# Patient Record
Sex: FEMALE | Race: OTHER | NOT HISPANIC OR LATINO
[De-identification: names, ages, dates, MRNs, and addresses within clinical notes are randomized per-mention and may not be internally consistent; named-entity substitution may affect disease eponyms.]

---

## 2022-12-01 ENCOUNTER — TRANSCRIPTION ENCOUNTER (OUTPATIENT)
Age: 37
End: 2022-12-01

## 2022-12-01 ENCOUNTER — INPATIENT (INPATIENT)
Facility: HOSPITAL | Age: 37
LOS: 0 days | Discharge: HOME | End: 2022-12-02
Attending: OBSTETRICS & GYNECOLOGY | Admitting: OBSTETRICS & GYNECOLOGY

## 2022-12-01 VITALS
TEMPERATURE: 98 F | RESPIRATION RATE: 16 BRPM | SYSTOLIC BLOOD PRESSURE: 136 MMHG | HEART RATE: 77 BPM | OXYGEN SATURATION: 100 % | DIASTOLIC BLOOD PRESSURE: 79 MMHG

## 2022-12-01 LAB
APPEARANCE UR: CLEAR — SIGNIFICANT CHANGE UP
BASOPHILS # BLD AUTO: 0 K/UL — SIGNIFICANT CHANGE UP (ref 0–0.2)
BASOPHILS NFR BLD AUTO: 0 % — SIGNIFICANT CHANGE UP (ref 0–2)
BILIRUB UR-MCNC: NEGATIVE — SIGNIFICANT CHANGE UP
BLD GP AB SCN SERPL QL: NEGATIVE — SIGNIFICANT CHANGE UP
COLOR SPEC: COLORLESS — SIGNIFICANT CHANGE UP
DIFF PNL FLD: ABNORMAL
EOSINOPHIL # BLD AUTO: 0 K/UL — SIGNIFICANT CHANGE UP (ref 0–0.5)
EOSINOPHIL NFR BLD AUTO: 0 % — SIGNIFICANT CHANGE UP (ref 0–6)
GLUCOSE UR QL: NEGATIVE — SIGNIFICANT CHANGE UP
HCG SERPL-ACNC: 2629 MIU/ML — SIGNIFICANT CHANGE UP
HCT VFR BLD CALC: 33.7 % — LOW (ref 34.5–45)
HGB BLD-MCNC: 10.5 G/DL — LOW (ref 11.5–15.5)
IANC: 9.87 K/UL — HIGH (ref 1.8–7.4)
KETONES UR-MCNC: NEGATIVE — SIGNIFICANT CHANGE UP
LEUKOCYTE ESTERASE UR-ACNC: NEGATIVE — SIGNIFICANT CHANGE UP
LYMPHOCYTES # BLD AUTO: 17.4 % — SIGNIFICANT CHANGE UP (ref 13–44)
LYMPHOCYTES # BLD AUTO: 2.39 K/UL — SIGNIFICANT CHANGE UP (ref 1–3.3)
MCHC RBC-ENTMCNC: 21.6 PG — LOW (ref 27–34)
MCHC RBC-ENTMCNC: 31.2 GM/DL — LOW (ref 32–36)
MCV RBC AUTO: 69.3 FL — LOW (ref 80–100)
MONOCYTES # BLD AUTO: 0.48 K/UL — SIGNIFICANT CHANGE UP (ref 0–0.9)
MONOCYTES NFR BLD AUTO: 3.5 % — SIGNIFICANT CHANGE UP (ref 2–14)
NEUTROPHILS # BLD AUTO: 10.65 K/UL — HIGH (ref 1.8–7.4)
NEUTROPHILS NFR BLD AUTO: 77.4 % — HIGH (ref 43–77)
NITRITE UR-MCNC: NEGATIVE — SIGNIFICANT CHANGE UP
PH UR: 6.5 — SIGNIFICANT CHANGE UP (ref 5–8)
PLATELET # BLD AUTO: 369 K/UL — SIGNIFICANT CHANGE UP (ref 150–400)
PROT UR-MCNC: ABNORMAL
RBC # BLD: 4.86 M/UL — SIGNIFICANT CHANGE UP (ref 3.8–5.2)
RBC # FLD: 16.8 % — HIGH (ref 10.3–14.5)
RH IG SCN BLD-IMP: POSITIVE — SIGNIFICANT CHANGE UP
SP GR SPEC: 1.01 — SIGNIFICANT CHANGE UP (ref 1.01–1.05)
UROBILINOGEN FLD QL: SIGNIFICANT CHANGE UP
WBC # BLD: 13.76 K/UL — HIGH (ref 3.8–10.5)
WBC # FLD AUTO: 13.76 K/UL — HIGH (ref 3.8–10.5)

## 2022-12-01 PROCEDURE — 99285 EMERGENCY DEPT VISIT HI MDM: CPT

## 2022-12-01 PROCEDURE — 76830 TRANSVAGINAL US NON-OB: CPT | Mod: 26

## 2022-12-01 RX ORDER — ACETAMINOPHEN 500 MG
650 TABLET ORAL ONCE
Refills: 0 | Status: COMPLETED | OUTPATIENT
Start: 2022-12-01 | End: 2022-12-01

## 2022-12-01 RX ADMIN — Medication 650 MILLIGRAM(S): at 17:44

## 2022-12-01 NOTE — ED ADULT TRIAGE NOTE - CHIEF COMPLAINT QUOTE
Pt is  (all C-sections) 6 weeks pregnant c/o vaginal bleeding w/ clots soaked 6-7 pads since this morning and associated lower abdominal/pelvic pain

## 2022-12-01 NOTE — ED PROVIDER NOTE - CLINICAL SUMMARY MEDICAL DECISION MAKING FREE TEXT BOX
Will order transvaginal sonogram to confirm IUP versus AV in progress versus ectopic pregnancy, will check CBC to assess H&H since the patient is bleeding, hCG level to compare with, UA and culture for symptoms of burning with urination to rule out UTI and consider consultation with OB depending on results of sono and labs.

## 2022-12-01 NOTE — ED PROVIDER NOTE - OBJECTIVE STATEMENT
37-year-old  whose last menstrual period was on 10/1/2022 here for vaginal bleeding that began last night associated with abdominal cramping and burning with urination. Patient denies fevers chills nausea vomiting weakness dizziness.

## 2022-12-01 NOTE — ED PROVIDER NOTE - PROGRESS NOTE DETAILS
Dr. Rhoades: US read as cervical ectopic pregnancy. Pt hemodynamically stable. Will discuss with OBGYN. Dr. Rhoades: OB/GYN resident at bedside, while discussing results of ultrasound with patient her  provides a note from her primary OB/GYN detailing cervical scar ectopic pregnancy.  OB/GYN will contact for further recommendations given admitting privileges for possible admission.

## 2022-12-01 NOTE — ED ADULT NURSE NOTE - OBJECTIVE STATEMENT
38 y/o female P3, AAOx4, ambulatory at baseline. Patient c/o of vaginal bleeding since yesterday at 12 pm. States she has saturated 8 pads today. Denies h/a, dizziness, cp. Patient noted to be breathing even and unlabored. No pallor or diaphoresis noted at this time. States she is 6 weeks pregnant. #20g placed to LAC. Labs drawn and sent as per MD order. Patient awaiting US.

## 2022-12-02 ENCOUNTER — TRANSCRIPTION ENCOUNTER (OUTPATIENT)
Age: 37
End: 2022-12-02

## 2022-12-02 ENCOUNTER — RESULT REVIEW (OUTPATIENT)
Age: 37
End: 2022-12-02

## 2022-12-02 ENCOUNTER — APPOINTMENT (OUTPATIENT)
Dept: ANTEPARTUM | Facility: HOSPITAL | Age: 37
End: 2022-12-02

## 2022-12-02 ENCOUNTER — ASOB RESULT (OUTPATIENT)
Age: 37
End: 2022-12-02

## 2022-12-02 VITALS
HEART RATE: 70 BPM | OXYGEN SATURATION: 98 % | DIASTOLIC BLOOD PRESSURE: 60 MMHG | RESPIRATION RATE: 17 BRPM | TEMPERATURE: 98 F | SYSTOLIC BLOOD PRESSURE: 102 MMHG

## 2022-12-02 DIAGNOSIS — O00.90 UNSPECIFIED ECTOPIC PREGNANCY WITHOUT INTRAUTERINE PREGNANCY: ICD-10-CM

## 2022-12-02 PROBLEM — Z00.00 ENCOUNTER FOR PREVENTIVE HEALTH EXAMINATION: Status: ACTIVE | Noted: 2022-12-02

## 2022-12-02 LAB
ALBUMIN SERPL ELPH-MCNC: 4 G/DL — SIGNIFICANT CHANGE UP (ref 3.3–5)
ALP SERPL-CCNC: 81 U/L — SIGNIFICANT CHANGE UP (ref 40–120)
ALT FLD-CCNC: 8 U/L — SIGNIFICANT CHANGE UP (ref 4–33)
ANION GAP SERPL CALC-SCNC: 10 MMOL/L — SIGNIFICANT CHANGE UP (ref 7–14)
AST SERPL-CCNC: 13 U/L — SIGNIFICANT CHANGE UP (ref 4–32)
BASOPHILS # BLD AUTO: 0.01 K/UL — SIGNIFICANT CHANGE UP (ref 0–0.2)
BASOPHILS NFR BLD AUTO: 0.1 % — SIGNIFICANT CHANGE UP (ref 0–2)
BILIRUB SERPL-MCNC: 0.6 MG/DL — SIGNIFICANT CHANGE UP (ref 0.2–1.2)
BUN SERPL-MCNC: 10 MG/DL — SIGNIFICANT CHANGE UP (ref 7–23)
CALCIUM SERPL-MCNC: 8.8 MG/DL — SIGNIFICANT CHANGE UP (ref 8.4–10.5)
CHLORIDE SERPL-SCNC: 104 MMOL/L — SIGNIFICANT CHANGE UP (ref 98–107)
CO2 SERPL-SCNC: 25 MMOL/L — SIGNIFICANT CHANGE UP (ref 22–31)
CREAT SERPL-MCNC: 0.57 MG/DL — SIGNIFICANT CHANGE UP (ref 0.5–1.3)
CULTURE RESULTS: SIGNIFICANT CHANGE UP
EGFR: 121 ML/MIN/1.73M2 — SIGNIFICANT CHANGE UP
EOSINOPHIL # BLD AUTO: 0.11 K/UL — SIGNIFICANT CHANGE UP (ref 0–0.5)
EOSINOPHIL NFR BLD AUTO: 1.3 % — SIGNIFICANT CHANGE UP (ref 0–6)
FLUAV AG NPH QL: SIGNIFICANT CHANGE UP
FLUBV AG NPH QL: SIGNIFICANT CHANGE UP
GLUCOSE SERPL-MCNC: 82 MG/DL — SIGNIFICANT CHANGE UP (ref 70–99)
HCT VFR BLD CALC: 31 % — LOW (ref 34.5–45)
HGB BLD-MCNC: 9.6 G/DL — LOW (ref 11.5–15.5)
IANC: 5.39 K/UL — SIGNIFICANT CHANGE UP (ref 1.8–7.4)
IMM GRANULOCYTES NFR BLD AUTO: 0.4 % — SIGNIFICANT CHANGE UP (ref 0–0.9)
LYMPHOCYTES # BLD AUTO: 2.12 K/UL — SIGNIFICANT CHANGE UP (ref 1–3.3)
LYMPHOCYTES # BLD AUTO: 25.4 % — SIGNIFICANT CHANGE UP (ref 13–44)
MCHC RBC-ENTMCNC: 21.8 PG — LOW (ref 27–34)
MCHC RBC-ENTMCNC: 31 GM/DL — LOW (ref 32–36)
MCV RBC AUTO: 70.3 FL — LOW (ref 80–100)
MONOCYTES # BLD AUTO: 0.7 K/UL — SIGNIFICANT CHANGE UP (ref 0–0.9)
MONOCYTES NFR BLD AUTO: 8.4 % — SIGNIFICANT CHANGE UP (ref 2–14)
NEUTROPHILS # BLD AUTO: 5.39 K/UL — SIGNIFICANT CHANGE UP (ref 1.8–7.4)
NEUTROPHILS NFR BLD AUTO: 64.4 % — SIGNIFICANT CHANGE UP (ref 43–77)
NRBC # BLD: 0 /100 WBCS — SIGNIFICANT CHANGE UP (ref 0–0)
NRBC # FLD: 0 K/UL — SIGNIFICANT CHANGE UP (ref 0–0)
PLATELET # BLD AUTO: 313 K/UL — SIGNIFICANT CHANGE UP (ref 150–400)
POTASSIUM SERPL-MCNC: 3.6 MMOL/L — SIGNIFICANT CHANGE UP (ref 3.5–5.3)
POTASSIUM SERPL-SCNC: 3.6 MMOL/L — SIGNIFICANT CHANGE UP (ref 3.5–5.3)
PROT SERPL-MCNC: 6.4 G/DL — SIGNIFICANT CHANGE UP (ref 6–8.3)
RBC # BLD: 4.41 M/UL — SIGNIFICANT CHANGE UP (ref 3.8–5.2)
RBC # FLD: 17.1 % — HIGH (ref 10.3–14.5)
RSV RNA NPH QL NAA+NON-PROBE: SIGNIFICANT CHANGE UP
SARS-COV-2 RNA SPEC QL NAA+PROBE: SIGNIFICANT CHANGE UP
SODIUM SERPL-SCNC: 139 MMOL/L — SIGNIFICANT CHANGE UP (ref 135–145)
SPECIMEN SOURCE: SIGNIFICANT CHANGE UP
WBC # BLD: 8.36 K/UL — SIGNIFICANT CHANGE UP (ref 3.8–10.5)
WBC # FLD AUTO: 8.36 K/UL — SIGNIFICANT CHANGE UP (ref 3.8–10.5)

## 2022-12-02 PROCEDURE — 88305 TISSUE EXAM BY PATHOLOGIST: CPT | Mod: 26

## 2022-12-02 PROCEDURE — 99221 1ST HOSP IP/OBS SF/LOW 40: CPT | Mod: 25

## 2022-12-02 PROCEDURE — 76815 OB US LIMITED FETUS(S): CPT | Mod: 26

## 2022-12-02 PROCEDURE — 76817 TRANSVAGINAL US OBSTETRIC: CPT | Mod: 26

## 2022-12-02 RX ORDER — SODIUM CHLORIDE 9 MG/ML
1000 INJECTION, SOLUTION INTRAVENOUS
Refills: 0 | Status: DISCONTINUED | OUTPATIENT
Start: 2022-12-02 | End: 2022-12-02

## 2022-12-02 RX ORDER — LEVOTHYROXINE SODIUM 125 MCG
25 TABLET ORAL DAILY
Refills: 0 | Status: DISCONTINUED | OUTPATIENT
Start: 2022-12-02 | End: 2022-12-02

## 2022-12-02 RX ORDER — ACETAMINOPHEN 500 MG
1000 TABLET ORAL ONCE
Refills: 0 | Status: DISCONTINUED | OUTPATIENT
Start: 2022-12-02 | End: 2022-12-02

## 2022-12-02 RX ORDER — ACETAMINOPHEN 500 MG
650 TABLET ORAL EVERY 6 HOURS
Refills: 0 | Status: DISCONTINUED | OUTPATIENT
Start: 2022-12-02 | End: 2022-12-02

## 2022-12-02 RX ADMIN — SODIUM CHLORIDE 125 MILLILITER(S): 9 INJECTION, SOLUTION INTRAVENOUS at 21:37

## 2022-12-02 RX ADMIN — SODIUM CHLORIDE 125 MILLILITER(S): 9 INJECTION, SOLUTION INTRAVENOUS at 20:09

## 2022-12-02 RX ADMIN — Medication 650 MILLIGRAM(S): at 14:12

## 2022-12-02 RX ADMIN — Medication 650 MILLIGRAM(S): at 15:10

## 2022-12-02 NOTE — DISCHARGE NOTE PROVIDER - CARE PROVIDER_API CALL
Kerrie Khan)  Obstetrics and Gynecology  [address], [address]  [place] [address]  Phone: [phone]  Fax: [phone]  Follow Up Time:    Arlene Larry  OBSTETRICS AND GYNECOLOGY  [address], [address]  [place] [address]  Phone: [phone]  Fax: [phone]  Follow Up Time: 2 weeks

## 2022-12-02 NOTE — PROGRESS NOTE ADULT - SUBJECTIVE AND OBJECTIVE BOX
37y G_P_ presents with   HPI:  Gyn Consult Note  RADHA RHODES  37y  Female [id number]    HPI: 38yo  LMP 10/1 presenting with 1-day history of heavy vaginal bleeding. She has also had intermittent lower abdominal pain, but this is not her primary complaint. Patient is primarily Portuguese-speaking, refused  and  translated.     Of note, upon evaluation from GYN, patient presented a paper Rx explaining that she was seen by Dr. Larry earlier in the day () for heavy vaginal bleeding. She had an US which raised concern for  scar ectopic, 6 weeks and 1 day gestation. She had bHCG of 6257 on  (approx 48hrs ago).     At this time, patient reports using 10 pads during the day today, of which she nearly filled most. She is nauseous but attributes this to hunger. Denies chest pain, shortness of breath, fevers/chills, severe abdominal pain.       Name of GYN Physician: Dr. Larry    OBHx:    3x c/s at full term, , , , denies complications/transfusions  2x MAB  1x TOP s/p D&C   GYNHx: Denies fibroids, cysts, endometriosis, STI's, Abnormal pap smears   PMH: H/o HTN, (was briefly on medications but hasnt taken for years), GERD, Hypothyroidism  PSH: CSx3, D&C  Meds: Famotidine, Synthroid  All: NKDA    accepts blood     (02 Dec 2022 01:40)      OB/GYN HISTORY:   [place]:  Parity:  Term:  :  MAB/TAB/Ectopic:  Living:    Last Menstrual Period    Name of GYN Physician:  Date of Last Pap:  History of Abnormal Pap:  Date of Last Mammogram:  Date of Last Colonoscopy:  Current OCP use:     PAST MEDICAL & SURGICAL HISTORY:  No significant past surgical history  ceasean section          REVIEW OF SYSTEMS      General:	    Skin/Breast:  	  Ophthalmologic:  	  ENMT:	    Respiratory and Thorax:  	  Cardiovascular:	    Gastrointestinal:	    Genitourinary:	    Musculoskeletal:	    Neurological:	    Psychiatric:	    Hematology/Lymphatics:	    Endocrine:	    Allergic/Immunologic:	    MEDICATIONS  (STANDING):  lactated ringers. 1000 milliLiter(s) (125 mL/Hr) IV Continuous <Continuous>  levothyroxine 25 MICROGram(s) Oral daily    MEDICATIONS  (PRN):  acetaminophen     Tablet .. 650 milliGRAM(s) Oral every 6 hours PRN Moderate Pain (4 - 6)      Allergies    No Known Allergies    Intolerances        SOCIAL HISTORY:  FAMILY HISTORY:          Vital Signs Last 24 Hrs  T(C): 36.7 (02 Dec 2022 08:30), Max: 37.2 (01 Dec 2022 16:59)  T(F): 98.1 (02 Dec 2022 08:30), Max: 99 (01 Dec 2022 16:59)  HR: 70 (02 Dec 2022 08:30) (56 - 77)  BP: 114/72 (02 Dec 2022 08:30) (104/58 - 142/89)  BP(mean): --  RR: 16 (02 Dec 2022 08:30) (15 - 18)  SpO2: 99% (02 Dec 2022 08:30) (99% - 100%)    Parameters below as of 02 Dec 2022 08:30  Patient On (Oxygen Delivery Method): room air      EXAM:  Constitutional:  Abdomen:   GYN Exam:      LABS:                        9.6    8.36  )-----------( 313      ( 02 Dec 2022 06:50 )             31.0     12-02    139  |  104  |  10  ----------------------------<  82  3.6   |  25  |  0.57    Ca    8.8      02 Dec 2022 06:50    TPro  6.4  /  Alb  4.0  /  TBili  0.6  /  DBili  x   /  AST  13  /  ALT  8   /  AlkPhos  81  12-02      Urinalysis Basic - ( 01 Dec 2022 18:03 )    Color: Colorless / Appearance: Clear / S.011 / pH: x  Gluc: x / Ketone: Negative  / Bili: Negative / Urobili: <2 mg/dL   Blood: x / Protein: Trace / Nitrite: Negative   Leuk Esterase: Negative / RBC: 190 /HPF / WBC 1 /HPF   Sq Epi: x / Non Sq Epi: 0 /HPF / Bacteria: Negative        RADIOLOGY & ADDITIONAL STUDIES:      ASSESSMENT:  37yG_P_    Last Menstrual Period    with

## 2022-12-02 NOTE — H&P ADULT - NSHPLABSRESULTS_GEN_ALL_CORE
LABS:                          10.5   13.76 )-----------( 369      ( 01 Dec 2022 18:03 )             33.7       Type + Screen (22 @ 18:03)   ABO Interpretation: O   Rh Interpretation: Positive   Antibody Screen: Negative     HCG Quantitative, Serum: 2629.0      Urinalysis Basic - ( 01 Dec 2022 18:03 )    Color: Colorless / Appearance: Clear / S.011 / pH: x  Gluc: x / Ketone: Negative  / Bili: Negative / Urobili: <2 mg/dL   Blood: x / Protein: Trace / Nitrite: Negative   Leuk Esterase: Negative / RBC: 190 /HPF / WBC 1 /HPF   Sq Epi: x / Non Sq Epi: 0 /HPF / Bacteria: Negative        RADIOLOGY & ADDITIONAL STUDIES:        < from: US Transvaginal (22 @ 22:39) >    US TRANSVAGINAL                          PROCEDURE DATE:  2022          INTERPRETATION:  CLINICAL INFORMATION: Pelvic pain, pregnant . Beta-HCG   2629    LMP: 10/1/2022    Estimated Gestational Age by LMP: 8 weeks 5 days    COMPARISON: None available.    Endovaginal pelvic sonogram as per order. Transabdominal pelvic sonogram   was also performed as part of our standard protocol.    FINDINGS:  Uterus: Single intrauterine gestation is noted within the cervix    Gestational Sac Size (mean): 1.34 cm  Gestations Sac Shape : 6 weeks 1 day  [place] Rump Length: There is abnormal appearing fetal pole.  Estimated Gestational Age: 6 weeks 1 day mean sac diameter.  Yolk Sac: Normal  Fetal Heart Rate: Not detected    Right ovary: Not visualized  Left ovary: Not visualized    Fluid: None.    IMPRESSION:  Cervical ectopic pregnancy.  There is an abnormal appearing fetal pole.    No fetal cardiac activity is detected.      --- End of Report ---          WILIAN MITCHELL MD; Resident Radiologist  This document has been electronically signed.  BELEN MEYER MD; Attending Radiologist  This document has been electronically signed. Dec  1 2022 11:59PM    < end of copied text >

## 2022-12-02 NOTE — PROGRESS NOTE ADULT - ASSESSMENT
seen by mfm, repeat sono. Recommendation by Samia Crook and Garcia to have DVC rather than expectant management  No active blkeeding  Patient is on call to OR  Procedure and possible need for balloon or other intervention  for bleeding explained

## 2022-12-02 NOTE — PRE-OP CHECKLIST - TYPE OF SOLUTION
Maged needs an order placed for psychiatry and marriage counseling  I was going to enter it but unsure as to what to link it to  [facility] 
Order generated for Psychiatry as well as Psychology    There is no specific order for marital counseling
Patient advised
LR@125

## 2022-12-02 NOTE — H&P ADULT - HISTORY OF PRESENT ILLNESS
Gyn Consult Note  RADHA RHODES  37y  Female [id number]    HPI: 36yo  LMP 10/1 presenting with 1-day history of heavy vaginal bleeding. She has also had intermittent lower abdominal pain, but this is not her primary complaint. Patient is primarily Macedonian-speaking, refused  and  translated.     Of note, upon evaluation from GYN, patient presented a paper Rx explaining that she was seen by Dr. Larry earlier in the day () for heavy vaginal bleeding. She had an US which raised concern for  scar ectopic, 6 weeks and 1 day gestation. She had bHCG of 6257 on  (approx 48hrs ago).     At this time, patient reports using 10 pads during the day today, of which she nearly filled most. She is nauseous but attributes this to hunger. Denies chest pain, shortness of breath, fevers/chills, severe abdominal pain.       Name of GYN Physician: Dr. Larry    OBHx:    3x c/s at full term, , , , denies complications/transfusions  2x MAB  1x TOP s/p D&C   GYNHx: Denies fibroids, cysts, endometriosis, STI's, Abnormal pap smears   PMH: H/o HTN, (was briefly on medications but hasnt taken for years), GERD, Hypothyroidism  PSH: CSx3, D&C  Meds: Famotidine, Synthroid  All: NKDA    accepts blood

## 2022-12-02 NOTE — DISCHARGE NOTE NURSING/CASE MANAGEMENT/SOCIAL WORK - NSDCPNINST_GEN_ALL_CORE
Patient discharge home. Patient informed to follow up with provider for any fever or excessive vaginal bleeding, dizziness or lightheadedness and discharge or foul smell from vagina

## 2022-12-02 NOTE — H&P ADULT - NSHPPHYSICALEXAM_GEN_ALL_CORE
Physical Exam:   General: sitting comfortably in bed, NAD   Abd: Soft, non-tender, non-distended.  Bowel sounds present.    :  Scant bleeding on pad.    External labia wnl.  Bimanual exam with 6w sized uterus, tender to palpation.  Cervix closed.   Speculum Exam: No active bleeding from os. Possible tissue visualized at cervical os.  Physiologic discharge.    Ext: non-tender b/l, no edema

## 2022-12-02 NOTE — DISCHARGE NOTE PROVIDER - HOSPITAL COURSE
Patient presented on  with heavy vaginal bleeding.  She had an US which raised concern for  scar ectopic, 6 weeks and 1 day gestation. She had bHCG of 6257 on  (approx 48hrs ago). TVUS performed on  showed Cervical ectopic pregnancy. There is an abnormal appearing fetal pole. No fetal cardiac activity is detected. Patient was evaluated by MFM and recommendation by Dr. Crook and Dr. Zelaya to have DVC rather than expectant management. Patient made NPO.       She underwent uncomplicated ****. Please see operative note for full details. Patient was transferred to recovery room in stable condition.  On POD#1, Thurman catheter was discontinued and patient voided without issues. Patient's diet was advanced and she tolerated regular diet without issues.     Labs drawn post-operatively and on POD#1 were stable compared to pre-op.     POD#2 routine post-operative care was performed.  No notable events.   On POD#3 patient was deemed stable for discharge as she was meeting postoperative milestones - tolerating regular diet, ambulating without difficulty, voiding, and pain was well controlled on oral medications. Throughout admission, patient received prophylactic anticoagulation.    Patient was instructed to follow up with  *** in 2 weeks. Patient presented on  with heavy vaginal bleeding.  She had an US which raised concern for  scar ectopic, 6 weeks and 1 day gestation. She had bHCG of 6257 on  (approx 48hrs ago). TVUS performed on  showed Cervical ectopic pregnancy. There is an abnormal appearing fetal pole. No fetal cardiac activity is detected. Patient was evaluated by MFM and recommendation by Dr. Crook and Dr. Zelaya to have DVC rather than expectant management. Patient made NPO.       She underwent uncomplicated D&C. Please see operative note for full details. Patient was transferred to recovery room in stable condition.  On POD#1, Thurman catheter was discontinued and patient voided without issues. Patient's diet was advanced and she tolerated regular diet without issues.     Labs drawn post-operatively and on POD#1 were stable compared to pre-op.     POD#2 routine post-operative care was performed.  No notable events.   On POD#3 patient was deemed stable for discharge as she was meeting postoperative milestones - tolerating regular diet, ambulating without difficulty, voiding, and pain was well controlled on oral medications. Throughout admission, patient received prophylactic anticoagulation.    Patient was instructed to follow up with Dr. Khan in 2 weeks. Patient presented on  with heavy vaginal bleeding.  She had an US which raised concern for  scar ectopic, 6 weeks and 1 day gestation. She had bHCG of 6257 on  (approx 48hrs ago). TVUS performed on  showed Cervical ectopic pregnancy. There is an abnormal appearing fetal pole. No fetal cardiac activity is detected. Patient was evaluated by MFM and recommendation by Dr. Crook and Dr. Zelaya to have DVC rather than expectant management. Patient made NPO.       She underwent uncomplicated D&C. Please see operative note for full details. Patient was transferred to recovery room in stable condition.    Patient was instructed to follow up with Dr. Larry in 2 weeks.

## 2022-12-02 NOTE — H&P ADULT - ASSESSMENT
A/P: 38yo  at 6w1d presenting with concern for  section scar ectopic vs. cervical ectopic pregnancy on imaging and inappropriately downtrending bHCG. Patient is clinically and hemodynamically stable at the time of evaluation. Will admit to GYN for further delineation of type of ectopic pregnancy.     - Admit to GYN  - NPO@MN with IV fluids  - T&S  - AM CBC/CMP   - MFM consultation in AM for imaging and possible management     D/w Dr. Bill Marcos, PGY2

## 2022-12-02 NOTE — PATIENT PROFILE ADULT - PUBLIC BENEFITS
Health Maintenance Summary     Topic Due On Due Status Completed On Postpone Until Reason    MAMMOGRAM - BREAST CANCER SCREENING Jun 27, 2018 Not Due Jun 27, 2016      Colorectal Cancer Screening - Colonoscopy Jun 24, 2020 Not Due Jun 24, 2015      Immunization-Zoster Jan 30, 2013 Postponed  Jun 2, 2017 Patient Refused    Immunization - TDAP Pregnancy  Hidden       Medicare Wellness Visit Jan 30, 2018 Hidden May 12, 2016      IMMUNIZATION - DTaP/Tdap/Td Nov 11, 2008 Postponed Nov 10, 2008 Jun 2, 2017 Patient Refused    Immunization-Influenza Sep 1, 2017 Not Due             Patient is due for topics as listed above, she wishes to decline at this time .       no

## 2022-12-02 NOTE — BRIEF OPERATIVE NOTE - NSICDXBRIEFPROCEDURE_GEN_ALL_CORE_FT
PROCEDURES:  Dilation and curettage, uterus, using suction, with US guidance 02-Dec-2022 19:26:20  Kerrie Khan

## 2022-12-02 NOTE — DISCHARGE NOTE PROVIDER - NSDCFUADDINST_GEN_ALL_CORE_FT
Return to your regular way of eating.  Resume normal activity as tolerated, but no heavy lifting or strenuous activity for 2 weeks. Complete vaginal rest, no tampons, no douching, no tub bathing, no sexual activities for 2 weeks unless otherwise instructed by your doctor.  Call your doctor with any signs and symptoms of infection such as fever, chills, nausea or vomiting. Call your doctor if you're unable to tolerate food or have difficulty urinating.  Call your doctor if you have pain that is not relieved by over the counter medications.  Notify your doctor with any other concerns.  Follow up with Dr. Khan in 2 weeks.

## 2022-12-02 NOTE — DISCHARGE NOTE NURSING/CASE MANAGEMENT/SOCIAL WORK - PATIENT PORTAL LINK FT
You can access the FollowMyHealth Patient Portal offered by [facility] by registering at the following website: [email or web address]. By joining [employer]’s FollowMyHealth portal, you will also be able to view your health information using other applications (apps) compatible with our system.

## 2022-12-02 NOTE — DISCHARGE NOTE NURSING/CASE MANAGEMENT/SOCIAL WORK - NSDCPEFALRISK_GEN_ALL_CORE
For information on Fall & Injury Prevention, visit: [email or web address] OR  [email or web address] OR  https://www.cdc.gov/steadi/patient.html

## 2022-12-08 LAB — SURGICAL PATHOLOGY STUDY: SIGNIFICANT CHANGE UP

## 2023-01-19 ENCOUNTER — EMERGENCY (EMERGENCY)
Facility: HOSPITAL | Age: 38
LOS: 1 days | Discharge: HOME | End: 2023-01-19
Attending: EMERGENCY MEDICINE | Admitting: EMERGENCY MEDICINE
Payer: COMMERCIAL

## 2023-01-19 VITALS
HEART RATE: 81 BPM | DIASTOLIC BLOOD PRESSURE: 63 MMHG | SYSTOLIC BLOOD PRESSURE: 100 MMHG | TEMPERATURE: 99 F | RESPIRATION RATE: 16 BRPM | OXYGEN SATURATION: 100 %

## 2023-01-19 VITALS
HEIGHT: 59 IN | OXYGEN SATURATION: 99 % | HEART RATE: 80 BPM | SYSTOLIC BLOOD PRESSURE: 162 MMHG | RESPIRATION RATE: 18 BRPM | TEMPERATURE: 98 F | DIASTOLIC BLOOD PRESSURE: 88 MMHG

## 2023-01-19 LAB
ALBUMIN SERPL ELPH-MCNC: 4.4 G/DL — SIGNIFICANT CHANGE UP (ref 3.3–5)
ALP SERPL-CCNC: 94 U/L — SIGNIFICANT CHANGE UP (ref 40–120)
ALT FLD-CCNC: 9 U/L — SIGNIFICANT CHANGE UP (ref 4–33)
ANION GAP SERPL CALC-SCNC: 9 MMOL/L — SIGNIFICANT CHANGE UP (ref 7–14)
AST SERPL-CCNC: 15 U/L — SIGNIFICANT CHANGE UP (ref 4–32)
BASOPHILS # BLD AUTO: 0 K/UL — SIGNIFICANT CHANGE UP (ref 0–0.2)
BASOPHILS NFR BLD AUTO: 0 % — SIGNIFICANT CHANGE UP (ref 0–2)
BILIRUB SERPL-MCNC: 0.6 MG/DL — SIGNIFICANT CHANGE UP (ref 0.2–1.2)
BUN SERPL-MCNC: 10 MG/DL — SIGNIFICANT CHANGE UP (ref 7–23)
CALCIUM SERPL-MCNC: 9.3 MG/DL — SIGNIFICANT CHANGE UP (ref 8.4–10.5)
CHLORIDE SERPL-SCNC: 108 MMOL/L — HIGH (ref 98–107)
CO2 SERPL-SCNC: 23 MMOL/L — SIGNIFICANT CHANGE UP (ref 22–31)
CREAT SERPL-MCNC: 0.55 MG/DL — SIGNIFICANT CHANGE UP (ref 0.5–1.3)
EGFR: 123 ML/MIN/1.73M2 — SIGNIFICANT CHANGE UP
EOSINOPHIL # BLD AUTO: 0 K/UL — SIGNIFICANT CHANGE UP (ref 0–0.5)
EOSINOPHIL NFR BLD AUTO: 0 % — SIGNIFICANT CHANGE UP (ref 0–6)
FLUAV AG NPH QL: SIGNIFICANT CHANGE UP
FLUBV AG NPH QL: SIGNIFICANT CHANGE UP
GLUCOSE SERPL-MCNC: 101 MG/DL — HIGH (ref 70–99)
HCG SERPL-ACNC: <5 MIU/ML — SIGNIFICANT CHANGE UP
HCT VFR BLD CALC: 33.6 % — LOW (ref 34.5–45)
HGB BLD-MCNC: 10.2 G/DL — LOW (ref 11.5–15.5)
IANC: 8.13 K/UL — HIGH (ref 1.8–7.4)
LYMPHOCYTES # BLD AUTO: 0.94 K/UL — LOW (ref 1–3.3)
LYMPHOCYTES # BLD AUTO: 8.9 % — LOW (ref 13–44)
MAGNESIUM SERPL-MCNC: 2.3 MG/DL — SIGNIFICANT CHANGE UP (ref 1.6–2.6)
MCHC RBC-ENTMCNC: 20.7 PG — LOW (ref 27–34)
MCHC RBC-ENTMCNC: 30.4 GM/DL — LOW (ref 32–36)
MCV RBC AUTO: 68.3 FL — LOW (ref 80–100)
MONOCYTES # BLD AUTO: 0.65 K/UL — SIGNIFICANT CHANGE UP (ref 0–0.9)
MONOCYTES NFR BLD AUTO: 6.2 % — SIGNIFICANT CHANGE UP (ref 2–14)
NEUTROPHILS # BLD AUTO: 7.93 K/UL — HIGH (ref 1.8–7.4)
NEUTROPHILS NFR BLD AUTO: 75.2 % — SIGNIFICANT CHANGE UP (ref 43–77)
PHOSPHATE SERPL-MCNC: 2.4 MG/DL — LOW (ref 2.5–4.5)
PLATELET # BLD AUTO: 384 K/UL — SIGNIFICANT CHANGE UP (ref 150–400)
POTASSIUM SERPL-MCNC: 4.4 MMOL/L — SIGNIFICANT CHANGE UP (ref 3.5–5.3)
POTASSIUM SERPL-SCNC: 4.4 MMOL/L — SIGNIFICANT CHANGE UP (ref 3.5–5.3)
PROT SERPL-MCNC: 8.2 G/DL — SIGNIFICANT CHANGE UP (ref 6–8.3)
RBC # BLD: 4.92 M/UL — SIGNIFICANT CHANGE UP (ref 3.8–5.2)
RBC # FLD: 17.2 % — HIGH (ref 10.3–14.5)
RSV RNA NPH QL NAA+NON-PROBE: SIGNIFICANT CHANGE UP
SARS-COV-2 RNA SPEC QL NAA+PROBE: SIGNIFICANT CHANGE UP
SODIUM SERPL-SCNC: 140 MMOL/L — SIGNIFICANT CHANGE UP (ref 135–145)
TROPONIN T, HIGH SENSITIVITY RESULT: <6 NG/L — SIGNIFICANT CHANGE UP
WBC # BLD: 10.55 K/UL — HIGH (ref 3.8–10.5)
WBC # FLD AUTO: 10.55 K/UL — HIGH (ref 3.8–10.5)

## 2023-01-19 PROCEDURE — 71046 X-RAY EXAM CHEST 2 VIEWS: CPT | Mod: 26

## 2023-01-19 PROCEDURE — 99285 EMERGENCY DEPT VISIT HI MDM: CPT

## 2023-01-19 RX ORDER — SODIUM CHLORIDE 9 MG/ML
1000 INJECTION, SOLUTION INTRAVENOUS ONCE
Refills: 0 | Status: COMPLETED | OUTPATIENT
Start: 2023-01-19 | End: 2023-01-19

## 2023-01-19 RX ORDER — ONDANSETRON 8 MG/1
4 TABLET, FILM COATED ORAL ONCE
Refills: 0 | Status: COMPLETED | OUTPATIENT
Start: 2023-01-19 | End: 2023-01-19

## 2023-01-19 RX ADMIN — SODIUM CHLORIDE 1000 MILLILITER(S): 9 INJECTION, SOLUTION INTRAVENOUS at 12:36

## 2023-01-19 RX ADMIN — ONDANSETRON 4 MILLIGRAM(S): 8 TABLET, FILM COATED ORAL at 12:36

## 2023-01-19 NOTE — ED PROVIDER NOTE - PATIENT PORTAL LINK FT
You can access the FollowMyHealth Patient Portal offered by [facility] by registering at the following website: [email or web address]. By joining [employer]’s FollowMyHealth portal, you will also be able to view your health information using other applications (apps) compatible with our system.

## 2023-01-19 NOTE — ED PROVIDER NOTE - NSFOLLOWUPINSTRUCTIONS_ED_ALL_ED_FT
Chest Pain  Chest pain can be caused by many different conditions which may or may not be dangerous. Causes include heartburn, lung infections, heart attack, blood clot in lungs, skin infections, strain or damage to muscle, cartilage, or bones, etc. In addition to a history and physical examination, an electrocardiogram (ECG) or other lab tests may have been performed to determine the cause of your chest pain. Follow up with your primary care provider or with a cardiologist as instructed.     SEEK IMMEDIATE MEDICAL CARE IF YOU HAVE ANY OF THE FOLLOWING SYMPTOMS: worsening chest pain, coughing up blood, unexplained back/neck/jaw pain, severe abdominal pain, dizziness or lightheadedness, fainting, shortness of breath, sweaty or clammy skin, vomiting, or racing heart beat. These symptoms may represent a serious problem that is an emergency. Do not wait to see if the symptoms will go away. Get medical help right away. Call 911 and do not drive yourself to the hospital.    please follow up with your cardiologist within the week.

## 2023-01-19 NOTE — ED PROVIDER NOTE - PHYSICAL EXAMINATION
PHYSICAL EXAM:  CONSTITUTIONAL: Well appearing, awake, alert, oriented to person, place, time/situation and in no apparent distress.  HEAD: Atraumatic  EYES: Clear bilaterally, pupils equal, round and reactive to light.  ENMT: Airway patent, Nasal mucosa clear. Mouth with normal mucosa. Uvula is midline.   CARDIAC: Normal rate, regular rhythm. +S1/S2. No murmurs, rubs or gallops. no ecchymosis. CP non reproducible with palpation.   RESPIRATORY: Breathing unlabored. Breath sounds clear and equal bilaterally.  ABDOMEN:  Soft, nontender, nondistended. No rebound tenderness or guarding.  NEUROLOGICAL: Alert and oriented, no focal deficits, no motor or sensory deficits. CN2-12 intact. Sensation intact x4 extremities. patient feels lightheaded when standing  SKIN: Skin warm and dry. No evidence of rashes or lesions.

## 2023-01-19 NOTE — ED PROVIDER NOTE - OBJECTIVE STATEMENT
37-year-old female with a past medical history of hypertension and a family history of sudden cardiac death at age of 52 presents after a syncopal episode.  Patient states before the episode she was walking downstairs started having chest pain and felt as if the world was going black.  Patient's  caught her.  Patient's  states that he could not find a pulse and perform CPR for 30 to 45 seconds.  Patient's  states he been found a pulse.  Patient states she has been having chest pain for over 2 years which is worsened in severity in the last month.  Patient's describes the pain as pressure sometimes radiates down her arms or to her back.  Patient recently went to her cardiologist who performed an echo and stress test.  Patient's cardiologist recommended a cardiac angiogram.  Patient's denying any fevers chills.  Patient states she is currently nauseous.  Patient states she feels safe at home. 37-year-old female with a past medical history of hypertension and a family history of sudden cardiac death at age of 52 presents after a syncopal episode.  Patient states before the episode she was walking downstairs started having chest pain and felt as if the world was going black.  Patient's  caught her. Did not hit head. Patient's  states that he could not find a pulse and perform CPR for 30 to 45 seconds.  Patient's  states he been found a pulse.  Patient states she has been having chest pain for over 2 years which is worsened in severity in the last month.  Patient's describes the pain as pressure sometimes radiates down her arms or to her back.  Patient recently went to her cardiologist who performed an echo and stress test.  Patient's cardiologist recommended a cardiac angiogram.  Patient's denying any fevers chills.  Patient states she is currently nauseous.  Patient states she feels safe at home. - gen [id number] 37-year-old female with a past medical history of hypertension and a family history of sudden cardiac death at age of 52 presents after a syncopal episode.  Patient states before the episode she was walking downstairs started having chest pain and felt as if the world was going black.  Patient's  caught her. Did not hit head. Patient's  states that he could not find a pulse and perform CPR for 30 to 45 seconds.  Patient's  states he been found a pulse.  Patient states she has been having chest pain for over 2 years which is worsened in severity in the last month.  Patient's describes the pain as pressure sometimes radiates down her arms or to her back.  Patient recently went to her cardiologist who performed an echo and stress test.  Patient's cardiologist recommended a cardiac CT.  Patient's denying any fevers chills.  Patient states she is currently nauseous.  Patient states she feels safe at home. - gen [id number]    Attendinyo female presents with syncope at home.  pt has been having chest pain for 2-3 days.  today was going up steps and felt lightheaded and fainted.   performed CPR for 20 seconds and pt became alert.  pt feels fine now.  no history of heart disease.  pt had normal stress and echo.

## 2023-01-19 NOTE — ED PROVIDER NOTE - PROGRESS NOTE DETAILS
Annie Whitney MD (PGY-1 EM): patient feels better, patient is able to ambulate. discussed return precautions and need to f/u w cards this week.

## 2023-01-19 NOTE — ED ADULT NURSE NOTE - OBJECTIVE STATEMENT
Pt received to intake room 6 A&OX4 ambulatory c/o witnessed syncopal episode today. As per triage note, Pt unresponsive and  performed CPR. Pt arrives awake, alert, ambulatory with steady gait. Equal strength to BUE. No facial droop or slurred speech noted. Endorsing mild nausea. Pt endorsing CP x 3 days. EKG obtained. 18G IV placed in LAC. Labs drawn and sent. MD at bedside for eval.

## 2023-01-19 NOTE — ED ADULT TRIAGE NOTE - CHIEF COMPLAINT QUOTE
Patient brought to ER by EMS from home after  called and stated she was unresponsive, As per , who is a MD, Pt had a verbal dispute with her  and became unresponsive for a couple of seconds and he administered CPR. Pt is responsive and answering questions. Pt also is somewhat anxious. FS was 131 in field. Pt is not a person living with diabetes. EKG done in [place].

## 2023-01-19 NOTE — ED ADULT NURSE REASSESSMENT NOTE - NS ED NURSE REASSESS COMMENT FT1
Patient ambulated to restroom and back to RW- tolerated well. States she is not dizzy.  Comfort and safety maintained. All current care needs met. Care plan continued Jaclyn CASSIDY

## 2023-01-19 NOTE — ED ADULT NURSE REASSESSMENT NOTE - NS ED NURSE REASSESS COMMENT FT1
Patient received in stretcher. AOX4. Respirations even and unlabored. Spontaneous movement of all extremities noted. No signs of acute distress noted. Comfort and safety maintained. All current care needs met. Care plan continued Jaclyn CASSIDY

## 2023-01-19 NOTE — ED PROVIDER NOTE - CLINICAL SUMMARY MEDICAL DECISION MAKING FREE TEXT BOX
87
37-year-old female with a past medical history of hypertension and a family history of sudden cardiac death at age of 50 presents after a syncopal episode and CP. concern for ACS vs. metabolic vs electrolyte vs. pregnancy. most likely vasovagal. EKG is negative for Brugada or HCOM pathology. ordered labs, imaging, meds and will re-eval.

## 2023-02-06 ENCOUNTER — EMERGENCY (EMERGENCY)
Facility: HOSPITAL | Age: 38
LOS: 1 days | Discharge: HOME | End: 2023-02-06
Attending: EMERGENCY MEDICINE | Admitting: EMERGENCY MEDICINE
Payer: COMMERCIAL

## 2023-02-06 VITALS
SYSTOLIC BLOOD PRESSURE: 133 MMHG | HEIGHT: 59 IN | RESPIRATION RATE: 16 BRPM | TEMPERATURE: 99 F | DIASTOLIC BLOOD PRESSURE: 90 MMHG | OXYGEN SATURATION: 100 % | HEART RATE: 87 BPM

## 2023-02-06 VITALS
HEART RATE: 92 BPM | SYSTOLIC BLOOD PRESSURE: 139 MMHG | DIASTOLIC BLOOD PRESSURE: 89 MMHG | OXYGEN SATURATION: 100 % | TEMPERATURE: 100 F | RESPIRATION RATE: 17 BRPM

## 2023-02-06 LAB
ALBUMIN SERPL ELPH-MCNC: 4.3 G/DL — SIGNIFICANT CHANGE UP (ref 3.3–5)
ALP SERPL-CCNC: 99 U/L — SIGNIFICANT CHANGE UP (ref 40–120)
ALT FLD-CCNC: 12 U/L — SIGNIFICANT CHANGE UP (ref 4–33)
ANION GAP SERPL CALC-SCNC: 12 MMOL/L — SIGNIFICANT CHANGE UP (ref 7–14)
APPEARANCE UR: ABNORMAL
AST SERPL-CCNC: 29 U/L — SIGNIFICANT CHANGE UP (ref 4–32)
BACTERIA # UR AUTO: ABNORMAL
BASOPHILS # BLD AUTO: 0.02 K/UL — SIGNIFICANT CHANGE UP (ref 0–0.2)
BASOPHILS NFR BLD AUTO: 0.1 % — SIGNIFICANT CHANGE UP (ref 0–2)
BILIRUB SERPL-MCNC: 0.5 MG/DL — SIGNIFICANT CHANGE UP (ref 0.2–1.2)
BILIRUB UR-MCNC: NEGATIVE — SIGNIFICANT CHANGE UP
BUN SERPL-MCNC: 9 MG/DL — SIGNIFICANT CHANGE UP (ref 7–23)
CALCIUM SERPL-MCNC: 9 MG/DL — SIGNIFICANT CHANGE UP (ref 8.4–10.5)
CHLORIDE SERPL-SCNC: 103 MMOL/L — SIGNIFICANT CHANGE UP (ref 98–107)
CO2 SERPL-SCNC: 22 MMOL/L — SIGNIFICANT CHANGE UP (ref 22–31)
COLOR SPEC: SIGNIFICANT CHANGE UP
CREAT SERPL-MCNC: 0.51 MG/DL — SIGNIFICANT CHANGE UP (ref 0.5–1.3)
DIFF PNL FLD: NEGATIVE — SIGNIFICANT CHANGE UP
EGFR: 125 ML/MIN/1.73M2 — SIGNIFICANT CHANGE UP
EOSINOPHIL # BLD AUTO: 0.11 K/UL — SIGNIFICANT CHANGE UP (ref 0–0.5)
EOSINOPHIL NFR BLD AUTO: 0.6 % — SIGNIFICANT CHANGE UP (ref 0–6)
EPI CELLS # UR: 11 /HPF — HIGH (ref 0–5)
FLUAV AG NPH QL: SIGNIFICANT CHANGE UP
FLUBV AG NPH QL: SIGNIFICANT CHANGE UP
GLUCOSE SERPL-MCNC: 94 MG/DL — SIGNIFICANT CHANGE UP (ref 70–99)
GLUCOSE UR QL: NEGATIVE — SIGNIFICANT CHANGE UP
HCG SERPL-ACNC: <5 MIU/ML — SIGNIFICANT CHANGE UP
HCT VFR BLD CALC: 34.6 % — SIGNIFICANT CHANGE UP (ref 34.5–45)
HGB BLD-MCNC: 10.5 G/DL — LOW (ref 11.5–15.5)
HYALINE CASTS # UR AUTO: 2 /LPF — SIGNIFICANT CHANGE UP (ref 0–7)
IANC: 14.53 K/UL — HIGH (ref 1.8–7.4)
IMM GRANULOCYTES NFR BLD AUTO: 0.4 % — SIGNIFICANT CHANGE UP (ref 0–0.9)
KETONES UR-MCNC: ABNORMAL
LEUKOCYTE ESTERASE UR-ACNC: NEGATIVE — SIGNIFICANT CHANGE UP
LYMPHOCYTES # BLD AUTO: 1.52 K/UL — SIGNIFICANT CHANGE UP (ref 1–3.3)
LYMPHOCYTES # BLD AUTO: 8.9 % — LOW (ref 13–44)
MCHC RBC-ENTMCNC: 20.6 PG — LOW (ref 27–34)
MCHC RBC-ENTMCNC: 30.3 GM/DL — LOW (ref 32–36)
MCV RBC AUTO: 68 FL — LOW (ref 80–100)
MONOCYTES # BLD AUTO: 0.86 K/UL — SIGNIFICANT CHANGE UP (ref 0–0.9)
MONOCYTES NFR BLD AUTO: 5 % — SIGNIFICANT CHANGE UP (ref 2–14)
NEUTROPHILS # BLD AUTO: 14.53 K/UL — HIGH (ref 1.8–7.4)
NEUTROPHILS NFR BLD AUTO: 85 % — HIGH (ref 43–77)
NITRITE UR-MCNC: NEGATIVE — SIGNIFICANT CHANGE UP
NRBC # BLD: 0 /100 WBCS — SIGNIFICANT CHANGE UP (ref 0–0)
NRBC # FLD: 0 K/UL — SIGNIFICANT CHANGE UP (ref 0–0)
PH UR: 8 — SIGNIFICANT CHANGE UP (ref 5–8)
PLATELET # BLD AUTO: 348 K/UL — SIGNIFICANT CHANGE UP (ref 150–400)
POTASSIUM SERPL-MCNC: 4.4 MMOL/L — SIGNIFICANT CHANGE UP (ref 3.5–5.3)
POTASSIUM SERPL-SCNC: 4.4 MMOL/L — SIGNIFICANT CHANGE UP (ref 3.5–5.3)
PROT SERPL-MCNC: 7.9 G/DL — SIGNIFICANT CHANGE UP (ref 6–8.3)
PROT UR-MCNC: ABNORMAL
RBC # BLD: 5.09 M/UL — SIGNIFICANT CHANGE UP (ref 3.8–5.2)
RBC # FLD: 17 % — HIGH (ref 10.3–14.5)
RBC CASTS # UR COMP ASSIST: 1 /HPF — SIGNIFICANT CHANGE UP (ref 0–4)
RSV RNA NPH QL NAA+NON-PROBE: SIGNIFICANT CHANGE UP
SARS-COV-2 RNA SPEC QL NAA+PROBE: SIGNIFICANT CHANGE UP
SODIUM SERPL-SCNC: 137 MMOL/L — SIGNIFICANT CHANGE UP (ref 135–145)
SP GR SPEC: 1.02 — SIGNIFICANT CHANGE UP (ref 1.01–1.05)
UROBILINOGEN FLD QL: SIGNIFICANT CHANGE UP
WBC # BLD: 17.1 K/UL — HIGH (ref 3.8–10.5)
WBC # FLD AUTO: 17.1 K/UL — HIGH (ref 3.8–10.5)
WBC UR QL: 1 /HPF — SIGNIFICANT CHANGE UP (ref 0–5)

## 2023-02-06 PROCEDURE — 74177 CT ABD & PELVIS W/CONTRAST: CPT | Mod: 26,QQ

## 2023-02-06 PROCEDURE — 99284 EMERGENCY DEPT VISIT MOD MDM: CPT

## 2023-02-06 RX ORDER — ACETAMINOPHEN 500 MG
650 TABLET ORAL ONCE
Refills: 0 | Status: COMPLETED | OUTPATIENT
Start: 2023-02-06 | End: 2023-02-06

## 2023-02-06 RX ORDER — ONDANSETRON 8 MG/1
4 TABLET, FILM COATED ORAL ONCE
Refills: 0 | Status: COMPLETED | OUTPATIENT
Start: 2023-02-06 | End: 2023-02-06

## 2023-02-06 RX ADMIN — Medication 650 MILLIGRAM(S): at 09:51

## 2023-02-06 RX ADMIN — ONDANSETRON 4 MILLIGRAM(S): 8 TABLET, FILM COATED ORAL at 09:22

## 2023-02-06 RX ADMIN — Medication 650 MILLIGRAM(S): at 09:21

## 2023-02-06 NOTE — ED PROVIDER NOTE - PATIENT PORTAL LINK FT
You can access the FollowMyHealth Patient Portal offered by [facility] by registering at the following website: [email or web address]. By joining [employer]’s FollowMyHealth portal, you will also be able to view your health information using other applications (apps) compatible with our system.

## 2023-02-06 NOTE — ED PROVIDER NOTE - PROGRESS NOTE DETAILS
Juli Valdez PGY1: Radiology informed me that there is air seen in cervix on CT. GYN saw pt and states air is likely from recent D&C 1 month ago. Pelvic exam unremarkable.   pt's pain likely from enteritis as seen on CT. Pt okay for dc at this time.

## 2023-02-06 NOTE — ED PROVIDER NOTE - CLINICAL SUMMARY MEDICAL DECISION MAKING FREE TEXT BOX
36 y/o F with PMHx of hypothyroidism and HTN presents to the ED for abdominal pain. Diffuse TTP of abdomen. No fevers, chills, diarrhea or vomiting. Differentials include but not limited to diverticulitis, colitis, viral illness. Will get labs, give meds and get CT abd/pelvis. Less likely to be acute appendicitis or diverticulitis as pain is diffuse. Dispo likely home.

## 2023-02-06 NOTE — CONSULT NOTE ADULT - SUBJECTIVE AND OBJECTIVE BOX
RADHA RHODES  37y  Female [id number]    HPI: 38yo  LMP 10 days ago p/w 1 day of severe diffuse abdominal pain. Reports that it is located mostly around her belly button, pointing to all 4 quadrants and radiates occasionally to her back. Improved with Tylenol at home. She denies any fevers, chills, nausea, vomiting, diarrhea. Denies any sick contacts. Denies chest pain, SOB.     Name of GYN Physician: Dr Larry  OBHx:    3x c/s at full term, , , , denies complications/transfusions  2x MAB  1x TOP s/p D&C   GYNHx: Denies fibroids, cysts, endometriosis, STI's, Abnormal pap smears   PMH: H/o HTN, (was briefly on medications but hasnt taken for years), GERD, Hypothyroidism  PSH: CSx3, D&C  Meds: Famotidine, Synthroid  All: NKDA    accepts blood    Vital Signs Last 24 Hrs  T(C): 37 (2023 08:43), Max: 37 (2023 08:43)  T(F): 98.6 (2023 08:43), Max: 98.6 (2023 08:43)  HR: 87 (2023 08:43) (87 - 87)  BP: 133/90 (2023 08:43) (133/90 - 133/90)  RR: 16 (2023 08:43) (16 - 16)  SpO2: 100% (2023 08:43) (100% - 100%)    Parameters below as of 2023 08:43  Patient On (Oxygen Delivery Method): room air    Physical Exam:   General: sitting comfortably in bed, NAD   Back: No CVA tenderness  Abd: Soft, diffusely tender to palpation without rebound or guarding. Non-distended.   :  No bleeding on pad.  External labia wnl.  Bimanual exam with no cervical motion tenderness, uterus wnl, adnexa non palpable b/l.  Cervix closed.  Speculum Exam: Physiologic discharge. No blood in vagina or from cervix.   Ext: non-tender b/l, no edema     LABS:                        10.5   17.10 )-----------( 348      ( 2023 09:29 )             34.6     02-    137  |  103  |  9   ----------------------------<  94  4.4   |  22  |  0.51    Ca    9.0      2023 09:29    TPro  7.9  /  Alb  4.3  /  TBili  0.5  /  DBili  x   /  AST  29  /  ALT  12  /  AlkPhos  99  02-    I&O's Detail      Urinalysis Basic - ( 2023 09:19 )    Color: Light Yellow / Appearance: Slightly Turbid / S.017 / pH: x  Gluc: x / Ketone: Small  / Bili: Negative / Urobili: <2 mg/dL   Blood: x / Protein: 30 mg/dL / Nitrite: Negative   Leuk Esterase: Negative / RBC: 1 /HPF / WBC 1 /HPF   Sq Epi: x / Non Sq Epi: 11 /HPF / Bacteria: Few        RADIOLOGY & ADDITIONAL STUDIES:    < from: CT Abdomen and Pelvis w/ IV Cont (23 @ 11:33) >    ACC: [id number] EXAM:  CT ABDOMEN AND PELVIS IC   ORDERED BY: KEY MAHAJAN     PROCEDURE DATE:  2023          INTERPRETATION:  CLINICAL INFORMATION: 37 years  Female with Diffuse abd   pain. History of D&C for incomplete  2022.    COMPARISON: None.    CONTRAST/COMPLICATIONS:  IV Contrast: Omnipaque 350  90 cc administered   10 cc discarded  Oral Contrast: NONE  Complications: None reported at time of study completion    PROCEDURE:  CT of the Abdomen and Pelvis was performed.  Sagittal and coronal reformats were performed.    FINDINGS:  LOWER CHEST: Within normal limits.    LIVER: Within normal limits.  BILE DUCTS: Normal caliber.  GALLBLADDER: Within normal limits.  SPLEEN: Within normal limits.  PANCREAS: Within normal limits.  ADRENALS: Within normal limits.  KIDNEYS/URETERS: Within normal limits.    BLADDER: Within normal limits.  REPRODUCTIVE ORGANS: 2.6 cm left ovarian cyst. Air droplets in the   external os and internal os of the cervix. A small volume fluid in   cervical canal..    BOWEL: No bowel obstruction. Scattered fluid-filled nondilated small   bowel loops. Appendix is normal.  PERITONEUM: No ascites.  VESSELS: Within normal limits.  RETROPERITONEUM/LYMPH NODES: No lymphadenopathy.  ABDOMINAL WALL: Mild rectus diastases.  BONES: Within normal limits.    IMPRESSION:  2.6 cm left ovarian cyst. Recommend pelvic ultrasound.    Air droplets in the internal and external cervical os with small fluid in   the cervical canal. Correlate clinically for recentinstrumentation   versus infection.    Scattered fluid-filled nondilated small bowel may reflect enteritis.    Findings were discussed with Dr. KEY MAHAJAN [phone] 2023 12:10   PM by Dr. Crystal Ward with read back confirmation.    --- End ofReport ---      CRYSTAL WARD MD; Attending Radiologist  This document has been electronically signed. 2023 12:11PM    < end of copied text >   RADHA RHODES  37y  Female [id number]    HPI: 38yo  LMP 10 days ago p/w 1 day of severe diffuse abdominal pain. Reports that it is located mostly around her umbilicus , pointing to all 4 quadrants and radiates occasionally to her back. Improved with Tylenol at home. She denies any fevers, chills, nausea, vomiting, diarrhea. Denies any sick contacts. Denies chest pain, SOB.     Name of GYN Physician: Dr Larry  OBHx:    3x c/s at full term, , , , denies complications/transfusions  2x MAB  1x TOP s/p D&C   GYNHx: Denies fibroids, cysts, endometriosis, STI's, Abnormal pap smears   PMH: H/o HTN, (was briefly on medications but hasnt taken for years), GERD, Hypothyroidism  PSH: CSx3, D&C  Meds: Famotidine, Synthroid  All: NKDA    accepts blood    Vital Signs Last 24 Hrs  T(C): 37 (2023 08:43), Max: 37 (2023 08:43)  T(F): 98.6 (2023 08:43), Max: 98.6 (2023 08:43)  HR: 87 (2023 08:43) (87 - 87)  BP: 133/90 (2023 08:43) (133/90 - 133/90)  RR: 16 (2023 08:43) (16 - 16)  SpO2: 100% (2023 08:43) (100% - 100%)    Parameters below as of 2023 08:43  Patient On (Oxygen Delivery Method): room air    Physical Exam:   General: sitting comfortably in bed, NAD   Back: No CVA tenderness  Abd: Soft, diffusely tender to palpation without rebound or guarding. Non-distended.   :  No bleeding on pad.  External labia wnl.  Bimanual exam with no cervical motion tenderness, uterus wnl, adnexa non palpable b/l.  Cervix closed.  Speculum Exam: Physiologic discharge. No blood in vagina or from cervix.   Ext: non-tender b/l, no edema     LABS:                        10.5   17.10 )-----------( 348      ( 2023 09:29 )             34.6     02-    137  |  103  |  9   ----------------------------<  94  4.4   |  22  |  0.51    Ca    9.0      2023 09:29    TPro  7.9  /  Alb  4.3  /  TBili  0.5  /  DBili  x   /  AST  29  /  ALT  12  /  AlkPhos  99  02-    I&O's Detail      Urinalysis Basic - ( 2023 09:19 )    Color: Light Yellow / Appearance: Slightly Turbid / S.017 / pH: x  Gluc: x / Ketone: Small  / Bili: Negative / Urobili: <2 mg/dL   Blood: x / Protein: 30 mg/dL / Nitrite: Negative   Leuk Esterase: Negative / RBC: 1 /HPF / WBC 1 /HPF   Sq Epi: x / Non Sq Epi: 11 /HPF / Bacteria: Few        RADIOLOGY & ADDITIONAL STUDIES:    < from: CT Abdomen and Pelvis w/ IV Cont (23 @ 11:33) >    ACC: [id number] EXAM:  CT ABDOMEN AND PELVIS IC   ORDERED BY: KEY MAHAJAN     PROCEDURE DATE:  2023          INTERPRETATION:  CLINICAL INFORMATION: 37 years  Female with Diffuse abd   pain. History of D&C for incomplete  2022.    COMPARISON: None.    CONTRAST/COMPLICATIONS:  IV Contrast: Omnipaque 350  90 cc administered   10 cc discarded  Oral Contrast: NONE  Complications: None reported at time of study completion    PROCEDURE:  CT of the Abdomen and Pelvis was performed.  Sagittal and coronal reformats were performed.    FINDINGS:  LOWER CHEST: Within normal limits.    LIVER: Within normal limits.  BILE DUCTS: Normal caliber.  GALLBLADDER: Within normal limits.  SPLEEN: Within normal limits.  PANCREAS: Within normal limits.  ADRENALS: Within normal limits.  KIDNEYS/URETERS: Within normal limits.    BLADDER: Within normal limits.  REPRODUCTIVE ORGANS: 2.6 cm left ovarian cyst. Air droplets in the   external os and internal os of the cervix. A small volume fluid in   cervical canal..    BOWEL: No bowel obstruction. Scattered fluid-filled nondilated small   bowel loops. Appendix is normal.  PERITONEUM: No ascites.  VESSELS: Within normal limits.  RETROPERITONEUM/LYMPH NODES: No lymphadenopathy.  ABDOMINAL WALL: Mild rectus diastases.  BONES: Within normal limits.    IMPRESSION:  2.6 cm left ovarian cyst. Recommend pelvic ultrasound.    Air droplets in the internal and external cervical os with small fluid in   the cervical canal. Correlate clinically for recentinstrumentation   versus infection.    Scattered fluid-filled nondilated small bowel may reflect enteritis.    Findings were discussed with Dr. KEY MAHAJAN [phone] 2023 12:10   PM by Dr. Crystal Ward with read back confirmation.    --- End ofReport ---      CRYSTAL WARD MD; Attending Radiologist  This document has been electronically signed. 2023 12:11PM    < end of copied text >

## 2023-02-06 NOTE — CONSULT NOTE ADULT - ASSESSMENT
INCOMPLETE      6yo  LMP 10 days ago p/w 1 day of severe diffuse abdominal pain. GYN consulted for CT findings of air and fluid within the cervical canal in setting of recent D&C in 2022. Patient with leukocytosis and diffuse abdominal pain on exam. No pelvic complaints.  Pelvic exam normal with no abnormal discharge, cervix normal appearing and no cervical motion tenderness.     - No acute GYN intervention   - Cervical radiologic findings likely physiologic.  - Recommend further work up of patients diffuse abdominal pain and leukocytosis per primary team.  - F/u outpatient with GYN per routine     D/w Dr Dago Cowan-Lizarraga PGY2

## 2023-02-06 NOTE — ED PROVIDER NOTE - ATTENDING CONTRIBUTION TO CARE
37 year old with pmh hypothyroid presents with 1 day of abd pain. concern for pancreatitis vs gb disease vs colitis vs sbo vs appendicitis vs ovarian torsion vs ectopic vs pyelo vs ua. will get cbc fro [facility] cmp for renal failure, ct abdomen pelvis for above diagnosis. will obtain gyn cx for ct findings. Cause discussed with gyn resident at bedside.

## 2023-02-06 NOTE — ED ADULT NURSE NOTE - OBJECTIVE STATEMENT
Patient presents to the ED for right sided abdominal pain, nausea since waking this morning at 4 AM. She denies vomiting, diarrhea, constipation. Mild tenderness to palpation of abdomen. She is AA&Ox4, in no acute distress. Respirations even, unlabored on room air. Hx HTN, hypothyroidism. 20G IV placed left AC, labs drawn and sent. Medicated as ordered. Pending CT.

## 2023-02-06 NOTE — ED PROVIDER NOTE - OBJECTIVE STATEMENT
38 y/o F with PMHx of hypothyroidism and HTN presents to the ED for abdominal pain. Pt states pain started last night and woke her up at 4 am. Pain is diffuse with nausea. Pt denies vomiting, fevers or diarrhea. Pt took Tylenol last night with some relief of symptoms. Pt denies headache, CP, SOB, v/d/c, dysuria, hematuria.

## 2023-02-07 LAB
CULTURE RESULTS: SIGNIFICANT CHANGE UP
SPECIMEN SOURCE: SIGNIFICANT CHANGE UP

## 2024-04-26 ENCOUNTER — NON-APPOINTMENT (OUTPATIENT)
Age: 39
End: 2024-04-26

## (undated) DEVICE — PREP BETADINE SPONGE STICKS

## (undated) DEVICE — VISITEC 4X4

## (undated) DEVICE — POSITIONER PINK PAD PIGAZZI SYSTEM

## (undated) DEVICE — GLV 6 PROTEXIS (WHITE)

## (undated) DEVICE — PACK PERI GYN

## (undated) DEVICE — VACUUM CURETTE BUSSE HOSP CURVED 10MM

## (undated) DEVICE — DRSG PAD SANITARY OB

## (undated) DEVICE — BASIN SET SINGLE

## (undated) DEVICE — DRSG TELFA 3 X 8

## (undated) DEVICE — POSITIONER STRAP ARMBOARD VELCRO TS-30

## (undated) DEVICE — GLV 7.5 PROTEXIS (WHITE)

## (undated) DEVICE — GOWN XL

## (undated) DEVICE — TUBING GYRUS ACMI COLLECTION SET 6FT

## (undated) DEVICE — GOWN LG

## (undated) DEVICE — VACUUM CURETTE BUSSE HOSP CURVED 7MM

## (undated) DEVICE — VACUUM CURETTE BUSSE HOSP CURVED 9MM

## (undated) DEVICE — VACUUM CURETTE BUSSE HOSP CURVED 8MM

## (undated) DEVICE — DRAPE TOWEL BLUE 17" X 24"

## (undated) DEVICE — VACUUM CURETTE BERKLEY OLYMPUS CURVED 8MM

## (undated) DEVICE — BOTTLE COLLECTION KIT CAP 3SM 2 LG

## (undated) DEVICE — SOL IRR POUR H2O 500ML

## (undated) DEVICE — VENODYNE/SCD SLEEVE CALF MEDIUM

## (undated) DEVICE — TISSUE TRAP BERKELEY SAFE TOUCH

## (undated) DEVICE — PROTECTOR HEEL / ELBOW FLUFFY